# Patient Record
Sex: MALE | URBAN - METROPOLITAN AREA
[De-identification: names, ages, dates, MRNs, and addresses within clinical notes are randomized per-mention and may not be internally consistent; named-entity substitution may affect disease eponyms.]

---

## 2020-09-16 ENCOUNTER — OFFICE VISIT (OUTPATIENT)
Dept: URBAN - METROPOLITAN AREA CLINIC 17 | Facility: CLINIC | Age: 85
End: 2020-09-16
Payer: COMMERCIAL

## 2020-09-16 DIAGNOSIS — H43.811 VITREOUS DEGENERATION, RIGHT EYE: ICD-10-CM

## 2020-09-16 PROCEDURE — 99204 OFFICE O/P NEW MOD 45 MIN: CPT | Performed by: OPTOMETRIST

## 2020-09-16 ASSESSMENT — INTRAOCULAR PRESSURE
OS: 10
OD: 9

## 2020-09-16 NOTE — IMPRESSION/PLAN
Impression: Combined forms of age-related cataract, bilateral: H25.813. Plan: Discussed diagnosis with patient. Cataract evaluation is recommended. Will refer to Dr. Madeline Pérez for CAT Eval. Pt wishes to proceed.

## 2020-09-16 NOTE — IMPRESSION/PLAN
Impression: Vitreous degeneration, right eye: H43.811. Plan: There is no evidence of retinal pathology. Recommend the patient return to office yearly for follow up.

## 2020-09-16 NOTE — IMPRESSION/PLAN
Impression: Benign neoplasm of right choroid: D31.31. Plan: Discussed diagnosis with patient. Reviewed OPTOS, No treatment is required. Will continue to monitor.

## 2020-10-01 ENCOUNTER — OFFICE VISIT (OUTPATIENT)
Dept: URBAN - METROPOLITAN AREA CLINIC 17 | Facility: CLINIC | Age: 85
End: 2020-10-01
Payer: COMMERCIAL

## 2020-10-01 DIAGNOSIS — H25.813 COMBINED FORMS OF AGE-RELATED CATARACT, BILATERAL: Primary | ICD-10-CM

## 2020-10-01 PROCEDURE — 92004 COMPRE OPH EXAM NEW PT 1/>: CPT | Performed by: OPHTHALMOLOGY

## 2020-10-01 ASSESSMENT — KERATOMETRY
OS: 45.13
OD: 44.38

## 2020-10-01 ASSESSMENT — INTRAOCULAR PRESSURE
OS: 12
OD: 11

## 2020-10-01 ASSESSMENT — VISUAL ACUITY
OD: 20/30
OS: 20/40

## 2020-10-01 NOTE — IMPRESSION/PLAN
Impression: Combined forms of age-related cataract, bilateral: H25.813. Condition: established, worsening. Symptoms: could improve with surgery. Plan: Cataract accounts for patient's complaints. Reviewed risks, benefits, and procedure. Patient desires surgery, schedule ce/iol OD then OS, RL2, consider toric lens, distance refractive target, patient is clear for surgery in [facility]. Pt aware may need full time bifocal glasses after surgery for best vision if she does standard lens.

## 2020-11-24 ENCOUNTER — PRE-OPERATIVE VISIT (OUTPATIENT)
Dept: URBAN - METROPOLITAN AREA CLINIC 17 | Facility: CLINIC | Age: 85
End: 2020-11-24
Payer: COMMERCIAL

## 2020-11-24 PROCEDURE — 92136 OPHTHALMIC BIOMETRY: CPT | Performed by: OPHTHALMOLOGY

## 2020-11-24 PROCEDURE — 92025 CPTRIZED CORNEAL TOPOGRAPHY: CPT | Performed by: OPHTHALMOLOGY

## 2020-11-24 ASSESSMENT — PACHYMETRY
OD: 23.49
OD: 2.65
OS: 2.64
OS: 23.65

## 2020-12-10 ENCOUNTER — SURGERY (OUTPATIENT)
Dept: URBAN - METROPOLITAN AREA SURGERY 7 | Facility: SURGERY | Age: 85
End: 2020-12-10
Payer: COMMERCIAL

## 2020-12-10 PROCEDURE — 66984 XCAPSL CTRC RMVL W/O ECP: CPT | Performed by: OPHTHALMOLOGY

## 2020-12-11 ENCOUNTER — POST-OPERATIVE VISIT (OUTPATIENT)
Dept: URBAN - METROPOLITAN AREA CLINIC 17 | Facility: CLINIC | Age: 85
End: 2020-12-11
Payer: COMMERCIAL

## 2020-12-11 PROCEDURE — 99024 POSTOP FOLLOW-UP VISIT: CPT | Performed by: OPTOMETRIST

## 2020-12-11 ASSESSMENT — INTRAOCULAR PRESSURE
OS: 12
OD: 14

## 2020-12-11 NOTE — IMPRESSION/PLAN
Impression: S/P Cataract Extraction by phacoemulsification with IOL placement 25182 OD - 1 Day. Encounter for surgical aftercare following surgery on a sense organ  Z48.810.  Excellent post op course Plan: 1 wk, po2 --Taper Pred-Moxi-Nepaf QID x 1 wk, TID x 1 wk, BID x 1wk, QD x 1wk, then d/c

## 2020-12-17 ENCOUNTER — POST-OPERATIVE VISIT (OUTPATIENT)
Dept: URBAN - METROPOLITAN AREA CLINIC 17 | Facility: CLINIC | Age: 85
End: 2020-12-17
Payer: COMMERCIAL

## 2020-12-17 DIAGNOSIS — Z48.810 ENCOUNTER FOR SURGICAL AFTERCARE FOLLOWING SURGERY ON A SENSE ORGAN: Primary | ICD-10-CM

## 2020-12-17 PROCEDURE — 99024 POSTOP FOLLOW-UP VISIT: CPT | Performed by: OPTOMETRIST

## 2020-12-17 ASSESSMENT — INTRAOCULAR PRESSURE
OD: 12
OS: 10

## 2020-12-17 ASSESSMENT — VISUAL ACUITY: OD: 20/40

## 2020-12-17 NOTE — IMPRESSION/PLAN
Impression: S/P Cataract Extraction by phacoemulsification with IOL placement 77240 OD - 7 Days. Encounter for surgical aftercare following surgery on a sense organ  Z48.810.  Plan: 1 wk, 2nd eye --Taper Pred-Moxi-Nepaf TID x 1 wk, BID x 1wk, QD x 1wk, then d/c

## 2020-12-28 ENCOUNTER — SURGERY (OUTPATIENT)
Dept: URBAN - METROPOLITAN AREA SURGERY 7 | Facility: SURGERY | Age: 85
End: 2020-12-28
Payer: COMMERCIAL

## 2020-12-28 DIAGNOSIS — H25.812 COMBINED FORMS OF AGE-RELATED CATARACT, LEFT EYE: Primary | ICD-10-CM

## 2020-12-28 PROCEDURE — 66984 XCAPSL CTRC RMVL W/O ECP: CPT | Performed by: OPHTHALMOLOGY

## 2020-12-29 ENCOUNTER — POST-OPERATIVE VISIT (OUTPATIENT)
Dept: URBAN - METROPOLITAN AREA CLINIC 17 | Facility: CLINIC | Age: 85
End: 2020-12-29
Payer: COMMERCIAL

## 2020-12-29 DIAGNOSIS — Z96.1 PRESENCE OF INTRAOCULAR LENS: Primary | ICD-10-CM

## 2020-12-29 PROCEDURE — 99024 POSTOP FOLLOW-UP VISIT: CPT | Performed by: OPTOMETRIST

## 2020-12-29 ASSESSMENT — INTRAOCULAR PRESSURE
OS: 14
OD: 9

## 2020-12-29 NOTE — IMPRESSION/PLAN
Impression: S/P CE/Standard IOL SA60WF+ 21.50 OS - 1 Day. Presence of intraocular lens  Z96.1.  Post operative instructions reviewed - Plan: RTC in 1 week for PO2 OS/ PO3 OD 
OS:
--Taper Pred-Moxi-Nepaf QID x 1 wk, TID x 1 wk, BID x 1wk, QD x 1wk, then d/c
OD:
--Taper Pred-Moxi-Nepaf BID x 4 days, QD x 1wk, then d/c

## 2021-01-04 ENCOUNTER — POST-OPERATIVE VISIT (OUTPATIENT)
Dept: URBAN - METROPOLITAN AREA CLINIC 17 | Facility: CLINIC | Age: 86
End: 2021-01-04
Payer: COMMERCIAL

## 2021-01-04 PROCEDURE — 99024 POSTOP FOLLOW-UP VISIT: CPT | Performed by: OPTOMETRIST

## 2021-01-04 ASSESSMENT — INTRAOCULAR PRESSURE
OS: 11
OD: 11

## 2021-01-04 NOTE — IMPRESSION/PLAN
Impression: S/P CE/Standard IOL SA60WF+ 21.50 OS - 7 Days. Presence of intraocular lens  Z96.1.  Post operative instructions reviewed - Plan: RTC in 4-6 weeks for PO3/ Refraction OS:
--Taper Pred-Moxi-Nepaf TID x 1 wk, BID x 1wk, QD x 1wk, then d/c
OD:
--Finish Pred-Moxi-Nepaf QD x 5 days then d/c

## 2021-02-03 ENCOUNTER — POST-OPERATIVE VISIT (OUTPATIENT)
Dept: URBAN - METROPOLITAN AREA CLINIC 17 | Facility: CLINIC | Age: 86
End: 2021-02-03
Payer: COMMERCIAL

## 2021-02-03 PROCEDURE — 99024 POSTOP FOLLOW-UP VISIT: CPT | Performed by: OPTOMETRIST

## 2021-02-03 ASSESSMENT — INTRAOCULAR PRESSURE
OD: 9
OS: 11

## 2021-02-03 ASSESSMENT — VISUAL ACUITY
OS: 30
OD: 25

## 2021-02-03 NOTE — IMPRESSION/PLAN
Impression: S/P CE/Standard IOL SA60WF+ 21.50 OS - 37 Days. Presence of intraocular lens  Z96.1.  Excellent post op course Plan: 3 month DE with Dr. Alme Martinez with drops

## 2021-05-03 ENCOUNTER — OFFICE VISIT (OUTPATIENT)
Dept: URBAN - METROPOLITAN AREA CLINIC 17 | Facility: CLINIC | Age: 86
End: 2021-05-03
Payer: COMMERCIAL

## 2021-05-03 DIAGNOSIS — H18.413 ARCUS SENILIS, BILATERAL: ICD-10-CM

## 2021-05-03 DIAGNOSIS — H17.89 OTHER CORNEAL OPACITIES: ICD-10-CM

## 2021-05-03 DIAGNOSIS — D31.31 BENIGN NEOPLASM OF RIGHT CHOROID: Primary | ICD-10-CM

## 2021-05-03 DIAGNOSIS — H43.813 VITREOUS DEGENERATION, BILATERAL: ICD-10-CM

## 2021-05-03 PROCEDURE — 99214 OFFICE O/P EST MOD 30 MIN: CPT | Performed by: OPTOMETRIST

## 2021-05-03 ASSESSMENT — INTRAOCULAR PRESSURE
OD: 9
OS: 10

## 2021-05-03 NOTE — IMPRESSION/PLAN
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 Will continue to monitor.

## 2022-05-04 ENCOUNTER — OFFICE VISIT (OUTPATIENT)
Dept: URBAN - METROPOLITAN AREA CLINIC 17 | Facility: CLINIC | Age: 87
End: 2022-05-04
Payer: COMMERCIAL

## 2022-05-04 DIAGNOSIS — D31.31 BENIGN NEOPLASM OF RIGHT CHOROID: Primary | ICD-10-CM

## 2022-05-04 DIAGNOSIS — H18.413 ARCUS SENILIS, BILATERAL: ICD-10-CM

## 2022-05-04 DIAGNOSIS — H43.813 VITREOUS DEGENERATION, BILATERAL: ICD-10-CM

## 2022-05-04 DIAGNOSIS — H17.89 OTHER CORNEAL OPACITIES: ICD-10-CM

## 2022-05-04 DIAGNOSIS — Z96.1 PRESENCE OF INTRAOCULAR LENS: ICD-10-CM

## 2022-05-04 PROCEDURE — 99214 OFFICE O/P EST MOD 30 MIN: CPT | Performed by: OPTOMETRIST

## 2022-05-04 ASSESSMENT — INTRAOCULAR PRESSURE
OS: 9
OD: 11

## 2023-05-08 ENCOUNTER — OFFICE VISIT (OUTPATIENT)
Dept: URBAN - METROPOLITAN AREA CLINIC 17 | Facility: CLINIC | Age: 88
End: 2023-05-08
Payer: MEDICARE

## 2023-05-08 DIAGNOSIS — H43.813 VITREOUS DEGENERATION, BILATERAL: ICD-10-CM

## 2023-05-08 DIAGNOSIS — Z96.1 PRESENCE OF INTRAOCULAR LENS: ICD-10-CM

## 2023-05-08 DIAGNOSIS — D31.31 BENIGN NEOPLASM OF RIGHT CHOROID: Primary | ICD-10-CM

## 2023-05-08 DIAGNOSIS — H17.89 OTHER CORNEAL OPACITIES: ICD-10-CM

## 2023-05-08 PROCEDURE — 99214 OFFICE O/P EST MOD 30 MIN: CPT | Performed by: OPTOMETRIST

## 2023-05-08 ASSESSMENT — INTRAOCULAR PRESSURE
OS: 13
OD: 14

## 2023-05-08 NOTE — IMPRESSION/PLAN
Impression: Vitreous degeneration, bilateral: H43.813. Plan: There is no evidence of retinal pathology. No treatment is required at this time. Recommend the patient return to office yearly for follow up.

## 2023-05-08 NOTE — IMPRESSION/PLAN
Impression: Benign neoplasm of right choroid: D31.31. Plan: Discussed diagnosis in detail with patient. Reviewed OPTOS, condition appears stable. No treatment is required at this time. Will continue to observe condition and or symptoms. Okay to schedule refraction PRN for clearer/sharper vision.

## 2024-05-08 ENCOUNTER — OFFICE VISIT (OUTPATIENT)
Dept: URBAN - METROPOLITAN AREA CLINIC 17 | Facility: CLINIC | Age: 89
End: 2024-05-08
Payer: MEDICARE

## 2024-05-08 DIAGNOSIS — H26.493 OTHER SECONDARY CATARACT, BILATERAL: ICD-10-CM

## 2024-05-08 DIAGNOSIS — Z96.1 PRESENCE OF INTRAOCULAR LENS: ICD-10-CM

## 2024-05-08 DIAGNOSIS — H17.89 OTHER CORNEAL OPACITIES: ICD-10-CM

## 2024-05-08 DIAGNOSIS — H43.813 VITREOUS DEGENERATION, BILATERAL: ICD-10-CM

## 2024-05-08 DIAGNOSIS — D31.31 BENIGN NEOPLASM OF RIGHT CHOROID: Primary | ICD-10-CM

## 2024-05-08 PROCEDURE — 92014 COMPRE OPH EXAM EST PT 1/>: CPT | Performed by: OPTOMETRIST

## 2024-05-08 ASSESSMENT — INTRAOCULAR PRESSURE
OD: 15
OS: 15